# Patient Record
Sex: FEMALE | Race: WHITE | NOT HISPANIC OR LATINO | Employment: UNEMPLOYED | URBAN - METROPOLITAN AREA
[De-identification: names, ages, dates, MRNs, and addresses within clinical notes are randomized per-mention and may not be internally consistent; named-entity substitution may affect disease eponyms.]

---

## 2017-09-12 ENCOUNTER — HISTORICAL (OUTPATIENT)
Dept: RADIOLOGY | Facility: HOSPITAL | Age: 72
End: 2017-09-12

## 2018-04-03 ENCOUNTER — HISTORICAL (OUTPATIENT)
Dept: ADMINISTRATIVE | Facility: HOSPITAL | Age: 73
End: 2018-04-03

## 2018-04-11 ENCOUNTER — HISTORICAL (OUTPATIENT)
Dept: RADIOLOGY | Facility: HOSPITAL | Age: 73
End: 2018-04-11

## 2019-02-05 ENCOUNTER — HISTORICAL (OUTPATIENT)
Dept: SURGERY | Facility: HOSPITAL | Age: 74
End: 2019-02-05

## 2019-05-21 ENCOUNTER — HISTORICAL (OUTPATIENT)
Dept: RADIOLOGY | Facility: HOSPITAL | Age: 74
End: 2019-05-21

## 2019-08-16 ENCOUNTER — HISTORICAL (OUTPATIENT)
Dept: RADIOLOGY | Facility: HOSPITAL | Age: 74
End: 2019-08-16

## 2020-11-30 ENCOUNTER — HISTORICAL (OUTPATIENT)
Dept: RADIOLOGY | Facility: HOSPITAL | Age: 75
End: 2020-11-30

## 2020-12-07 ENCOUNTER — HISTORICAL (OUTPATIENT)
Dept: RADIOLOGY | Facility: HOSPITAL | Age: 75
End: 2020-12-07

## 2022-04-11 ENCOUNTER — HISTORICAL (OUTPATIENT)
Dept: ADMINISTRATIVE | Facility: HOSPITAL | Age: 77
End: 2022-04-11
Payer: MEDICARE

## 2022-04-22 ENCOUNTER — HISTORICAL (OUTPATIENT)
Dept: ADMINISTRATIVE | Facility: HOSPITAL | Age: 77
End: 2022-04-22
Payer: MEDICARE

## 2022-04-22 ENCOUNTER — HISTORICAL (OUTPATIENT)
Dept: RADIOLOGY | Facility: HOSPITAL | Age: 77
End: 2022-04-22
Payer: MEDICARE

## 2022-04-29 VITALS
BODY MASS INDEX: 35.89 KG/M2 | WEIGHT: 171 LBS | DIASTOLIC BLOOD PRESSURE: 100 MMHG | SYSTOLIC BLOOD PRESSURE: 160 MMHG | HEIGHT: 58 IN

## 2022-04-30 NOTE — OP NOTE
DATE OF SURGERY:        SURGEON:  Matt Olvera MD    PREOPERATIVE DIAGNOSES:  Tenosynovitis right index finger.  Carpal tunnel, right wrist.    POSTOPERATIVE DIAGNOSES:  Tenosynovitis right index finger.  Carpal tunnel, right wrist.    PROCEDURES PERFORMED:    1. Trigger release, right index finger.    2. Carpal tunnel release, right wrist.    ANESTHESIA:  Axillary block.    ESTIMATED BLOOD LOSS:  Minimal.    PROCEDURE IN DETAIL:  After an axillary block anesthetic, the right arm was prepped and draped in a sterile fashion.  The arm was exsanguinated and tourniquet inflated to 250 mmHg.  A transverse incision was placed in the distal palm at the base of the index finger.  Skin and subcutaneous tissue incised.  Neurovascular bundles were retracted.  The flexor tendon sheath identified and the A1, A2 pulley areas was then transected with a 69 [place] blade.  Traction on the tendon revealed no further triggering.  Saline-soaked gauze was placed in the incision.       Attention was turned to the proximal wrist.  The proximal palm was explored through a small curved incision.  Skin and subcutaneous tissue incised, bleeding was controlled with Bovie cautery.  The median nerve was identified at level of the distal wrist crease and the carpal ligament was transected along the ulnar border of the canal throughout the entire length of the canal using a 69 [place] blade.  During dissection, care was taken to identify and preserve the recurrent motor branch.  I tunneled proximally and distally in forearm and the subcutaneous fascia.  The epineurium surrounding the nerve was gently teased with fine pickups and forceps, and released the area of constriction.  Once the nerve was completely freed, the tourniquet was released.  Final hemostasis was obtained with Bovie cautery.  The skin incision was     closed with vertical mattress sutures of 5-0 and 6-0 Prolene.  The index incision was closed in a similar fashion.  A  sterile splint dressing was then applied, and procedure terminated.        ______________________________  MD SARITHA Clement/PARISH  DD:  02/05/2019  Time:  08:34AM  DT:  02/05/2019  Time:  08:46AM  Job #:  [id number]    cc: Steven Silva MD